# Patient Record
Sex: MALE | Race: WHITE | NOT HISPANIC OR LATINO | Employment: UNEMPLOYED | URBAN - NONMETROPOLITAN AREA
[De-identification: names, ages, dates, MRNs, and addresses within clinical notes are randomized per-mention and may not be internally consistent; named-entity substitution may affect disease eponyms.]

---

## 2022-02-11 ENCOUNTER — HOSPITAL ENCOUNTER (EMERGENCY)
Facility: HOSPITAL | Age: 37
Discharge: HOME | End: 2022-02-11
Attending: NURSE PRACTITIONER | Admitting: NURSE PRACTITIONER

## 2022-02-11 ENCOUNTER — NURSE TRIAGE (OUTPATIENT)
Dept: FAMILY MEDICINE | Facility: OTHER | Age: 37
End: 2022-02-11

## 2022-02-11 ENCOUNTER — APPOINTMENT (OUTPATIENT)
Dept: CT IMAGING | Facility: HOSPITAL | Age: 37
End: 2022-02-11
Attending: NURSE PRACTITIONER

## 2022-02-11 VITALS
DIASTOLIC BLOOD PRESSURE: 79 MMHG | HEART RATE: 67 BPM | SYSTOLIC BLOOD PRESSURE: 132 MMHG | RESPIRATION RATE: 16 BRPM | OXYGEN SATURATION: 99 % | TEMPERATURE: 98.6 F

## 2022-02-11 DIAGNOSIS — H00.013: ICD-10-CM

## 2022-02-11 DIAGNOSIS — L72.0 EPIDERMAL CYST OF FACE: Primary | ICD-10-CM

## 2022-02-11 LAB
ANION GAP SERPL CALCULATED.3IONS-SCNC: 5 MMOL/L (ref 3–14)
BUN SERPL-MCNC: 7 MG/DL (ref 7–30)
CALCIUM SERPL-MCNC: 9.3 MG/DL (ref 8.5–10.1)
CHLORIDE BLD-SCNC: 106 MMOL/L (ref 94–109)
CO2 SERPL-SCNC: 29 MMOL/L (ref 20–32)
CREAT SERPL-MCNC: 0.99 MG/DL (ref 0.66–1.25)
GFR SERPL CREATININE-BSD FRML MDRD: >90 ML/MIN/1.73M2
GLUCOSE BLD-MCNC: 100 MG/DL (ref 70–99)
POTASSIUM BLD-SCNC: 3.7 MMOL/L (ref 3.4–5.3)
SODIUM SERPL-SCNC: 140 MMOL/L (ref 133–144)

## 2022-02-11 PROCEDURE — 250N000011 HC RX IP 250 OP 636: Performed by: NURSE PRACTITIONER

## 2022-02-11 PROCEDURE — G0463 HOSPITAL OUTPT CLINIC VISIT: HCPCS

## 2022-02-11 PROCEDURE — 99213 OFFICE O/P EST LOW 20 MIN: CPT | Performed by: NURSE PRACTITIONER

## 2022-02-11 PROCEDURE — 80048 BASIC METABOLIC PNL TOTAL CA: CPT | Performed by: NURSE PRACTITIONER

## 2022-02-11 PROCEDURE — 70487 CT MAXILLOFACIAL W/DYE: CPT

## 2022-02-11 PROCEDURE — 36415 COLL VENOUS BLD VENIPUNCTURE: CPT | Performed by: NURSE PRACTITIONER

## 2022-02-11 RX ORDER — IOPAMIDOL 755 MG/ML
100 INJECTION, SOLUTION INTRAVASCULAR ONCE
Status: COMPLETED | OUTPATIENT
Start: 2022-02-11 | End: 2022-02-11

## 2022-02-11 RX ORDER — ERYTHROMYCIN 5 MG/G
0.5 OINTMENT OPHTHALMIC 4 TIMES DAILY
Qty: 10 G | Refills: 0 | Status: SHIPPED | OUTPATIENT
Start: 2022-02-11 | End: 2022-02-16

## 2022-02-11 RX ADMIN — IOPAMIDOL 100 ML: 755 INJECTION, SOLUTION INTRAVENOUS at 18:13

## 2022-02-11 ASSESSMENT — ENCOUNTER SYMPTOMS
SHORTNESS OF BREATH: 0
FEVER: 0
EYE REDNESS: 0
EYE ITCHING: 0
NAUSEA: 0
PSYCHIATRIC NEGATIVE: 1
DIARRHEA: 0
VOMITING: 0
EYE DISCHARGE: 0
EYE PAIN: 0
CHILLS: 0
PHOTOPHOBIA: 0

## 2022-02-11 NOTE — TELEPHONE ENCOUNTER
"Pt calling and is worried about a cyst near right corner of eye.He has had this for about two years.He was out shoveling and when he came in the middle of cyst had cream drainage coming out of it. Blood, oil and cream per pt. His right side of cheek hurst like a needle poking and feels numb.Denies numbness anywhere else. He states the blood vessels in seem to be bulging more.Is a little more warmer to touch than other parts of face.Was red when he first came in from shoveling but now just some of it is. He is worried it is spreading in his face.Advised ED now and if needs immediate attention call 911. He verbalized understanding.    Caitlin Campbell, RN      Additional Information    Negative: [1] Widespread rash AND [2] bright red, sunburn-like AND [3] too weak to stand    Negative: Sounds like a life-threatening emergency to the triager    Negative: Painful lump or swelling at opening to anus (rectum)    Negative: Painful lump or swelling at opening to vagina (on labia)    Negative: Painful lump or swelling on scrotum    Negative: Impetigo suspected or diagnosed    Negative: Doesn't match the SYMPTOMS of a boil    Negative: MRSA, questions about (No boil or other skin lesion)    Negative: Widespread red rash    Negative: Black (necrotic) color or blisters develop in wound    Negative: Patient sounds very sick or weak to the triager    Answer Assessment - Initial Assessment Questions  1. APPEARANCE of BOIL: \"What does the boil look like?\"       Two grapes combined on right eye, outside corner-has white coming out of it today out of the center of it, it was leaking and blood,oil combined, super red when he came back from shoveling,warm to touch, veins are showing more than normal  2. LOCATION: \"Where is the boil located?\"         3. NUMBER: \"How many boils are there?\"       One big one see above  4. SIZE: \"How big is the boil?\" (e.g., inches, cm; compare to size of a coin or other object)        5. ONSET: \"When did the " "boil start?\"      2 years ago  6. PAIN: \"Is there any pain?\" If so, ask: \"How bad is the pain?\"   (Scale 1-10; or mild, moderate, severe)      Whole face on right side hurts,numb on cheek bone feels like eye is hard to shut but feels normal.pricking feeling on cheeck  7. FEVER: \"Do you have a fever?\" If so, ask: \"What is it, how was it measured, and when did it start?\"      no8. SOURCE: \"Have you been around anyone with boils or other Staph infections?\" \"Have you ever had boils before?\"      no  9. OTHER SYMPTOMS: \"Do you have any other symptoms?\" (e.g., shaking chills, weakness, rash elsewhere on body)      no  10. PREGNANCY: \"Is there any chance you are pregnant?\" \"When was your last menstrual period?\"    Protocols used: BOIL (SKIN ABSCESS)-NAYA-AH      "

## 2022-02-11 NOTE — ED PROVIDER NOTES
History     Chief Complaint   Patient presents with     Cyst     HPI  Sarath Herrera is a 36 year old male who presents to urgent care today with complaints of a cyst to right side of face under right eye.  Onset 2 years ago but has been gradually increasing in size.  Today patient accidentally hit area with a shovel which caused pain and some creamy brown drainage.  Current pain 4/10, has not taken anything for discomfort.  Denies any eye pain, photophobia or vision changes.  Wears eyeglasses, has not followed up for an eye exam in 2+ years.  Denies any recent illness.  Denies any fever, chills, nausea, vomiting, diarrhea, shortness of breath or chest pain.  No other concerns.    Allergies:  No Known Allergies    Problem List:    There are no problems to display for this patient.       Past Medical History:    No past medical history on file.    Past Surgical History:    No past surgical history on file.    Family History:    No family history on file.    Social History:  Marital Status:  Single [1]  Social History     Tobacco Use     Smoking status: Not on file     Smokeless tobacco: Not on file   Substance Use Topics     Alcohol use: Not on file     Drug use: Not on file        Medications:    erythromycin (ROMYCIN) 5 MG/GM ophthalmic ointment      Review of Systems   Constitutional: Negative for chills and fever.   Eyes: Negative for photophobia, pain, discharge, redness, itching and visual disturbance.   Respiratory: Negative for shortness of breath.    Cardiovascular: Negative for chest pain.   Gastrointestinal: Negative for diarrhea, nausea and vomiting.   Musculoskeletal: Negative for gait problem.   Skin:        Cyst under right eye   Psychiatric/Behavioral: Negative.      Physical Exam   BP: 132/79  Pulse: 67  Temp: 98.6  F (37  C)  Resp: 16  SpO2: 99 %    Physical Exam  Vitals and nursing note reviewed.   Constitutional:       General: He is not in acute distress.     Appearance: He is not ill-appearing or  toxic-appearing.   HENT:      Right Ear: Tympanic membrane, ear canal and external ear normal.      Left Ear: Tympanic membrane, ear canal and external ear normal.      Nose: Nose normal.      Mouth/Throat:      Mouth: Mucous membranes are moist.      Pharynx: Oropharynx is clear. No oropharyngeal exudate or posterior oropharyngeal erythema.   Eyes:      General:         Right eye: Hordeolum present. No foreign body or discharge.      Extraocular Movements: Extraocular movements intact.      Conjunctiva/sclera: Conjunctivae normal.      Right eye: Right conjunctiva is not injected. No chemosis, exudate or hemorrhage.     Pupils: Pupils are equal, round, and reactive to light.   Cardiovascular:      Rate and Rhythm: Normal rate and regular rhythm.      Pulses: Normal pulses.      Heart sounds: Normal heart sounds.   Pulmonary:      Effort: Pulmonary effort is normal.      Breath sounds: Normal breath sounds.   Skin:     Comments: Cyst right side of face, under right eye.   Neurological:      Mental Status: He is alert.   Psychiatric:         Mood and Affect: Mood normal.           ED Course     Results for orders placed or performed during the hospital encounter of 02/11/22 (from the past 24 hour(s))   Basic metabolic panel   Result Value Ref Range    Sodium 140 133 - 144 mmol/L    Potassium 3.7 3.4 - 5.3 mmol/L    Chloride 106 94 - 109 mmol/L    Carbon Dioxide (CO2) 29 20 - 32 mmol/L    Anion Gap 5 3 - 14 mmol/L    Urea Nitrogen 7 7 - 30 mg/dL    Creatinine 0.99 0.66 - 1.25 mg/dL    Calcium 9.3 8.5 - 10.1 mg/dL    Glucose 100 (H) 70 - 99 mg/dL    GFR Estimate >90 >60 mL/min/1.73m2   CT Facial Bones with Contrast    Narrative    CT FACIAL BONES WITH CONTRAST    HISTORY: Right facial cyst,    TECHNIQUE: Contiguous axial images through the facial bones were  performed with contrast.      COMPARISON: None    FINDINGS:  Along the superficial aspect of the distal right  zygoma/lateral orbital wall, there is a soft tissue  density 2.8 x 2.1  cm mass. No underlying bony scalloping or invasion is identified. No  intraorbital component is seen.    No acute facial bone fracture or dislocation is identified.  No  orbital fracture is identified.  The globes are intact.  There is no  evidence of intraorbital hematoma or stranding.    The temporomandibular joints are intact. The visualized paranasal  sinuses and mastoid air cells are clear.      A right maxillary impacted molar with cyst formation is seen. Multiple  dental caries are noted.      Impression    IMPRESSION:    2.8 cm superficial soft tissue nodule of the right face. No underlying  bony invasion or reactive change. Differential considerations include  an epidermal inclusion cyst. Correlate with physical exam.    Cystic change associated with an unerupted right maxillary molar.  Multiple dental caries. Recommend dental follow-up.    TERRI NIEVES MD         SYSTEM ID:  [id number]       Medications   iopamidol (ISOVUE-370) solution 100 mL (100 mLs Intravenous Given 2/11/22 1813)   sodium chloride (PF) 0.9% PF flush 60 mL (50 mLs Intravenous Given 2/11/22 1813)       Assessments & Plan (with Medical Decision Making)     I have reviewed the nursing notes.    I have reviewed the findings, diagnosis, plan and need for follow up with the patient.  (L72.0) Epidermal cyst of face  (primary encounter diagnosis)  Plan: Otolaryngology Referral  (H00.013) Hordeolum externum of right eye  Plan:   Patient ambulatory with a nontoxic appearance.  Cyst to right side of face and hordeolum present to right upper eyelid.  Patient denies any eye pain, photophobia or visual disturbances.  Denies any fever, chills, nausea, vomiting, diarrhea, shortness of breath or chest pain.  CT facial bones with contrast completed and impression shows 2.8 cm superficial soft tissue nodule of the right face.  No underlying bony invasion or reactive change.  ENT referral placed for further evaluation.  Patient to  return to urgent care/ED with any worsening of cyst or signs of infection.  Warm compresses to hordeolum of right upper eyelid. Erythromycin eye ointment ordered.  Patient to return to urgent care-ED as needed.  Patient in agreement with treatment plan.    Discharge Medication List as of 2/11/2022  7:07 PM      START taking these medications    Details   erythromycin (ROMYCIN) 5 MG/GM ophthalmic ointment Place 0.5 inches into the right eye 4 times daily for 5 daysDisp-10 g, [id number]           Final diagnoses:   Epidermal cyst of face   Hordeolum externum of right eye     2/11/2022   [facility]     Kezia Valle, NP  02/11/22 1944

## 2022-02-11 NOTE — ED TRIAGE NOTES
"Pt presents with c/o a cyst under his right eye. Reports he has had it for two years. States he was shoveling when it started hurting then some drainage came out. Drainage was a \"cream brownish color\" then turned into a bloody substance. Pt [facility] into a free [facility] [place] [facility] months ago and was suppose to go back to schedule surgery.   "

## 2022-02-12 NOTE — DISCHARGE INSTRUCTIONS
Follow up with ENT for further evaluation of cyst.    Warm compresses to right eye followed by erythromycin eye ointment.  Clean washcloth for each use.      Return to urgent care-ED with any worsening in condition or additional concerns.          What to expect when you have contrast    During your exam, we will inject  contrast  into your vein or artery. (Contrast is a clear liquid with iodine in it. It shows up on X-rays.)    You may feel warm or hot. You may have a metal taste in your mouth and a slight upset stomach. You may also feel pressure near the kidneys and bladder. These effects will last about 1 to 3 minutes.    Please tell us if you have:   Sneezing    Itching   Hives    Swelling in the face   A hoarse voice   Breathing problems   Other new symptoms    Serious problems are rare.  They may include:   Irregular heartbeat    Seizures   Kidney failure             Tissue damage   Shock     Death    If you have any problems during the exam, we  will treat them right away.    When you get home    Call your hospital if you have any new symptoms in the next 2 days, like hives or swelling. (Phone numbers are at the bottom of this page.) Or call your family doctor.     If you have wheezing or trouble breathing, call 911.    Self-care  -Drink at least 4 extra glasses of water today.   This reduces the stress on your kidneys.  -Keep taking your regular medicines.    The contrast will pass out of your body in your  Urine(pee). This will happen in the next 24 hours. You  will not feel this. Your urine will not  change color.    If you have kidney problems or take metformin    Drink 4 to 8 large glasses of water for the next  2 days, if you are not on a fluid restriction.    ?If you take metformin (Glucophage or Glucovance) for diabetes, keep taking it.      ?Your kidney function tests are abnormal.  If you take Metformin, do not take it for 48 hours. Please go to your clinic for a blood test within 3 days after your  exam before the restarting this medicine.     (Note to provider:please give patient prescription for lab tests.)    ?Special instructions: -    I have read and understand the above information.    Patient Sign Here:______________________________________Date:________Time:______    Staff Sign Here:________________________________________Date:_______Time:______      Radiology Departments:     ?[facility]: [phone] ?[place]: [phone]     ?[place]: [phone] ?[facility]:[phone]      ?[place]: [phone]  ?[place]: [phone]  ?[place]:[phone]    ?[facility]:[phone]  ?[facility]:[phone]

## 2022-02-16 NOTE — PROGRESS NOTES
Otolaryngology Consultation    Patient: Sarath Herrera  : 1985    Patient presents with:  Consult: Epidermal Cyst of Face; Referred by Kezia Valle NP      HPI:  Sarath Herrera is a 36 year old male seen today for a referral from Kezia Valle NP in urgent care regarding a right facial cyst.  He presented to urgent care on  for evaluation of a right facial cyst.     This has been present over 2 years and has increased in size.   He initially thought this was a pimple but it has been slowly growing over the past 2 years.    Prior to his urgent care visit he had been out shovelling and noted the area enlarged, drained and has been more painful since exposure to cold.  Recent malodorous discharge.  Pain 9-10/10   Difficult to wear glasses due to the mass    He has been developing upper lid styes bilaterally x 2 weeks    He denies change in vision or any prior surgery to the area but does notice some is obstructed right peripheral vision due to the size of the mass    He denies any prior surgery or manipulation of the cyst    No prior history of trauma to area    Photos reviewed in epic there is a large right lateral infraorbital cyst.  This appears to be an epidermal inclusion cyst.  The upper lid was erythematous and edematous on 211.    CT facial bones was ordered in urgent care showing a zygomatical orbital soft tissue mass measuring 2.8 x 2.1 cm.  No bone invasion or erosion no intraorbital extension.  No fractures    Multiple dental caries are noted and a right impacted molar with cystic change    CT was reviewed the cyst is located at the medial end of the zygoma the zygoma is otherwise normal in appearance        No current outpatient medications on file.       Allergies: Patient has no known allergies.     No past medical history on file.    No past surgical history on file.    ENT family history reviewed    Social History     Tobacco Use     Smoking status: Never Smoker     Smokeless tobacco:  "Never Used   Substance Use Topics     Alcohol use: None     Drug use: Yes     Types: Marijuana     Comment: occassional       Review of Systems  ROS: 10 point ROS neg other than the symptoms noted above in the HPI and headaches    Physical Exam  /68 (BP Location: Right arm, Patient Position: Sitting, Cuff Size: Adult Large)   Pulse 93   Temp 98.5  F (36.9  C) (Tympanic)   Ht 1.854 m (6' 1\")   Wt 78.5 kg (173 lb)   SpO2 98%   BMI 22.82 kg/m    General - The patient is well nourished and well developed, and appears to have good nutritional status.  Alert and oriented to person and place, answers questions and cooperates with examination appropriately.   Head and Face - Normocephalic and atraumatic, with no gross asymmetry noted.  The facial nerve is intact, with strong symmetric movements.  Grade 1 out of 6 bilaterally.  Voice and Breathing - The patient was breathing comfortably without the use of accessory muscles. There was no wheezing, stridor, or stertor.  The patients voice was clear and strong, and had appropriate pitch and quality.  No landon peripheral digital clubbing or cyanosis   Eyes -large right infraorbital cyst.  Clinically this is consistent with a sebaceous cyst.  Photos in epic.  This measures 3 x 2.5 cm.  No drainage, mildly tender, no ulceration.    Bilateral upper lid erythematous nodules with central papule clinically consistent with hordeolum  Extraocular movements intact, and the pupils were reactive to light.  Sclera were not icteric or injected, conjunctiva were pink and moist.  Mouth - Examination of the oral cavity showed pink, healthy oral mucosa. No lesions or ulcerations noted.  The tongue was mobile and midline, and the dentition were in scattered condition with several missing teeth  Throat - The walls of the oropharynx were smooth, pink, moist, symmetric, and had no lesions or ulcerations.  The tonsillar pillars and soft palate were symmetric.  The uvula was midline on " elevation.     Nose - Nasal mucosa is pink and moist with no abnormal mucus.      Procedure:     PREOPERATIVE DIAGNOSES:   1.  Right infraorbital epidermal inclusion cyst cyst    POSTOPERATIVE DIAGNOSES:   1.  Same    PROCEDURE PERFORMED:   1.  Excision a complex right infraorbital inclusion cyst cyst incision length 2.8 cm     I discussed the risks and complications of excision right infraorbital cyst, including local anesthesia, bleeding, infection, injury to the eye,  facial nerve, scar formation, hypertrophic healing or keloid, numbness to area, recurrence of lesion, possible need for further surgery. All questions were answered and he agreed to proceed.    After informed consent was discussed and a time- out taken, I proceeded to cleanse the skin around the lesion with alcohol.  I then demarcated an incision parallel to relaxed skin tension lines overlying the cyst at the right infraorbital rim.  The upper lid was taped to protect the cornea.  The area was prepped and draped in the normal sterile fashion.  A timeout was taken.  I then infiltrated the skin with 1% lidocaine with 1:200,000 epinephrine.  I  used a 15-blade to incise the skin.    Bradley retractors were placed.  I used blunt and sharp dissection until I could identify the pseudocapsule of the sebaceous cyst.  The cyst was partially ruptured and a portion of the cystic material was removed with suction during dissection.  I was able to completely separate the entire capsule from the underlying subcutaneous tissue and orbicularis oculi.  The cyst was completely removed.  Due to the size and the location of the epidermal cyst this was submitted in formalin to pathology.  The wound was irrigated and gently suctioned.  Hemostasis was achieved with scant use of electrocautery and silver nitrate cautery and is adequate.    The total length of the incision was  2.8 cm.    There is a significant amount of redundant skin.  I trimmed the redundant lower lid  skin with tenotomy scissors for tension free closure.   I then proceeded to close the incision by using simple interrupted buried knot sutures, using 5-0 Vicryl.   The skin edges were then reapproximated using 5-0 nylon, simple interrupted sutures.  Antibiotic ointment was then applied and the wound was dressed.  The patient tolerated the procedure without any difficulty.        Impression and Plan- Sarath Herrera is a 36 year old male with:      ICD-10-CM    1. Hordeolum externum, bilateral upper lids  H00.019 Adult Eye Referral   2. Epidermal cyst of lower eyelid  L72.0 oxyCODONE (ROXICODONE) 5 MG tablet     cephALEXin (KEFLEX) 500 MG capsule     Surgical Pathology Exam     Excision of Eyelid Lesion   3. Dental caries  K02.9        Verbal and written wound care instructions were provided    Follow-up with DDS  Follow-up optometry    1 week follow-up       Leave the dressing in place for 3 days  Once the dressing comes off, begin wound cares  Take the antibiotic as prescribed  Take the pain medication if needed  Follow up with ENT in 1 week          Estelita Wilson D.O.  Otolaryngology/Head and Neck Surgery  Allergy

## 2022-02-17 ENCOUNTER — OFFICE VISIT (OUTPATIENT)
Dept: OTOLARYNGOLOGY | Facility: OTHER | Age: 37
End: 2022-02-17
Attending: NURSE PRACTITIONER

## 2022-02-17 VITALS
BODY MASS INDEX: 22.93 KG/M2 | OXYGEN SATURATION: 98 % | HEIGHT: 73 IN | HEART RATE: 93 BPM | SYSTOLIC BLOOD PRESSURE: 110 MMHG | DIASTOLIC BLOOD PRESSURE: 68 MMHG | WEIGHT: 173 LBS | TEMPERATURE: 98.5 F

## 2022-02-17 DIAGNOSIS — K02.9 DENTAL CARIES: ICD-10-CM

## 2022-02-17 DIAGNOSIS — L72.0 EPIDERMAL CYST OF FACE: ICD-10-CM

## 2022-02-17 DIAGNOSIS — H00.019 HORDEOLUM EXTERNUM, UNSPECIFIED LATERALITY: Primary | ICD-10-CM

## 2022-02-17 PROCEDURE — 88304 TISSUE EXAM BY PATHOLOGIST: CPT | Mod: TC | Performed by: OTOLARYNGOLOGY

## 2022-02-17 PROCEDURE — 11443 EXC FACE-MM B9+MARG 2.1-3 CM: CPT | Performed by: OTOLARYNGOLOGY

## 2022-02-17 PROCEDURE — 88304 TISSUE EXAM BY PATHOLOGIST: CPT | Mod: 26 | Performed by: PATHOLOGY

## 2022-02-17 PROCEDURE — 99204 OFFICE O/P NEW MOD 45 MIN: CPT | Mod: 25 | Performed by: OTOLARYNGOLOGY

## 2022-02-17 RX ORDER — OXYCODONE HYDROCHLORIDE 5 MG/1
5 TABLET ORAL EVERY 6 HOURS PRN
Qty: 5 TABLET | Refills: 0 | Status: SHIPPED | OUTPATIENT
Start: 2022-02-17 | End: 2022-02-21

## 2022-02-17 RX ORDER — CEPHALEXIN 500 MG/1
500 CAPSULE ORAL 3 TIMES DAILY
Qty: 21 CAPSULE | Refills: 0 | Status: SHIPPED | OUTPATIENT
Start: 2022-02-17 | End: 2022-02-21

## 2022-02-17 NOTE — LETTER
2022         RE: Sarath Herrera  [address]  [facility] [address]        Dear Colleague,    Thank you for referring your patient, Sarath Herrera, to the [facility]. Please see a copy of my visit note below.    Otolaryngology Consultation    Patient: Sarath Herrera  : 1985    Patient presents with:  Consult: Epidermal Cyst of Face; Referred by Kezia Valle NP      HPI:  Sarath Herrera is a 36 year old male seen today for a referral from Kezia Valle NP in urgent care regarding a right facial cyst.  He presented to urgent care on  for evaluation of a right facial cyst.     This has been present over 2 years and has increased in size.   He initially thought this was a pimple but it has been slowly growing over the past 2 years.    Prior to his urgent care visit he had been out shovelling and noted the area enlarged, drained and has been more painful since exposure to cold.  Recent malodorous discharge.  Pain 9-10/10   Difficult to wear glasses due to the mass    He has been developing upper lid styes bilaterally x 2 weeks    He denies change in vision or any prior surgery to the area but does notice some is obstructed right peripheral vision due to the size of the mass    He denies any prior surgery or manipulation of the cyst    No prior history of trauma to area    Photos reviewed in epic there is a large right lateral infraorbital cyst.  This appears to be an epidermal inclusion cyst.  The upper lid was erythematous and edematous on 211.    CT facial bones was ordered in urgent care showing a zygomatical orbital soft tissue mass measuring 2.8 x 2.1 cm.  No bone invasion or erosion no intraorbital extension.  No fractures    Multiple dental caries are noted and a right impacted molar with cystic change    CT was reviewed the cyst is located at the medial end of the zygoma the zygoma is otherwise normal in appearance        No current outpatient medications on file.  "      Allergies: Patient has no known allergies.     No past medical history on file.    No past surgical history on file.    ENT family history reviewed    Social History     Tobacco Use     Smoking status: Never Smoker     Smokeless tobacco: Never Used   Substance Use Topics     Alcohol use: None     Drug use: Yes     Types: Marijuana     Comment: occassional       Review of Systems  ROS: 10 point ROS neg other than the symptoms noted above in the HPI and headaches    Physical Exam  /68 (BP Location: Right arm, Patient Position: Sitting, Cuff Size: Adult Large)   Pulse 93   Temp 98.5  F (36.9  C) (Tympanic)   Ht 1.854 m (6' 1\")   Wt 78.5 kg (173 lb)   SpO2 98%   BMI 22.82 kg/m    General - The patient is well nourished and well developed, and appears to have good nutritional status.  Alert and oriented to person and place, answers questions and cooperates with examination appropriately.   Head and Face - Normocephalic and atraumatic, with no gross asymmetry noted.  The facial nerve is intact, with strong symmetric movements.  Grade 1 out of 6 bilaterally.  Voice and Breathing - The patient was breathing comfortably without the use of accessory muscles. There was no wheezing, stridor, or stertor.  The patients voice was clear and strong, and had appropriate pitch and quality.  No landon peripheral digital clubbing or cyanosis   Eyes -large right infraorbital cyst.  Clinically this is consistent with a sebaceous cyst.  Photos in epic.  This measures 3 x 2.5 cm.  No drainage, mildly tender, no ulceration.    Bilateral upper lid erythematous nodules with central papule clinically consistent with hordeolum  Extraocular movements intact, and the pupils were reactive to light.  Sclera were not icteric or injected, conjunctiva were pink and moist.  Mouth - Examination of the oral cavity showed pink, healthy oral mucosa. No lesions or ulcerations noted.  The tongue was mobile and midline, and the dentition were " in scattered condition with several missing teeth  Throat - The walls of the oropharynx were smooth, pink, moist, symmetric, and had no lesions or ulcerations.  The tonsillar pillars and soft palate were symmetric.  The uvula was midline on elevation.     Nose - Nasal mucosa is pink and moist with no abnormal mucus.      Procedure:     PREOPERATIVE DIAGNOSES:   1.  Right infraorbital epidermal inclusion cyst cyst    POSTOPERATIVE DIAGNOSES:   1.  Same    PROCEDURE PERFORMED:   1.  Excision a complex right infraorbital inclusion cyst cyst incision length 2.8 cm     I discussed the risks and complications of excision right infraorbital cyst, including local anesthesia, bleeding, infection, injury to the eye,  facial nerve, scar formation, hypertrophic healing or keloid, numbness to area, recurrence of lesion, possible need for further surgery. All questions were answered and he agreed to proceed.    After informed consent was discussed and a time- out taken, I proceeded to cleanse the skin around the lesion with alcohol.  I then demarcated an incision parallel to relaxed skin tension lines overlying the cyst at the right infraorbital rim.  The upper lid was taped to protect the cornea.  The area was prepped and draped in the normal sterile fashion.  A timeout was taken.  I then infiltrated the skin with 1% lidocaine with 1:200,000 epinephrine.  I  used a 15-blade to incise the skin.    Bradley retractors were placed.  I used blunt and sharp dissection until I could identify the pseudocapsule of the sebaceous cyst.  The cyst was partially ruptured and a portion of the cystic material was removed with suction during dissection.  I was able to completely separate the entire capsule from the underlying subcutaneous tissue and orbicularis oculi.  The cyst was completely removed.  Due to the size and the location of the epidermal cyst this was submitted in formalin to pathology.  The wound was irrigated and gently  suctioned.  Hemostasis was achieved with scant use of electrocautery and silver nitrate cautery and is adequate.    The total length of the incision was  2.8 cm.    There is a significant amount of redundant skin.  I trimmed the redundant lower lid skin with tenotomy scissors for tension free closure.   I then proceeded to close the incision by using simple interrupted buried knot sutures, using 5-0 Vicryl.   The skin edges were then reapproximated using 5-0 nylon, simple interrupted sutures.  Antibiotic ointment was then applied and the wound was dressed.  The patient tolerated the procedure without any difficulty.        Impression and Plan- Sarath Herrera is a 36 year old male with:      ICD-10-CM    1. Hordeolum externum, bilateral upper lids  H00.019 Adult Eye Referral   2. Epidermal cyst of lower eyelid  L72.0 oxyCODONE (ROXICODONE) 5 MG tablet     cephALEXin (KEFLEX) 500 MG capsule     Surgical Pathology Exam     Excision of Eyelid Lesion   3. Dental caries  K02.9        Verbal and written wound care instructions were provided    Follow-up with DDS  Follow-up optometry    1 week follow-up       Leave the dressing in place for 3 days  Once the dressing comes off, begin wound cares  Take the antibiotic as prescribed  Take the pain medication if needed  Follow up with ENT in 1 week          SHALONDA Pinto.O.  Otolaryngology/Head and Neck Surgery  Allergy                    Again, thank you for allowing me to participate in the care of your patient.        Sincerely,        Estelita Wilson MD

## 2022-02-17 NOTE — NURSING NOTE
"Chief Complaint   Patient presents with     Consult     Epidermal Cyst of Face; Referred by Kezia Valle NP       Initial /68 (BP Location: Right arm, Patient Position: Sitting, Cuff Size: Adult Large)   Pulse 93   Temp 98.5  F (36.9  C) (Tympanic)   Ht 1.854 m (6' 1\")   Wt 78.5 kg (173 lb)   SpO2 98%   BMI 22.82 kg/m   Estimated body mass index is 22.82 kg/m  as calculated from the following:    Height as of this encounter: 1.854 m (6' 1\").    Weight as of this encounter: 78.5 kg (173 lb).  Medication Reconciliation: complete  Lilliana Conley LPN    "

## 2022-02-17 NOTE — PATIENT INSTRUCTIONS
Thank you for allowing Dr. Wilson and our ENT team to participate in your care.  If your medications are too expensive, please give the nurse a call.  We can possibly change this medication.  If you have a scheduling or an appointment question please contact our Health Unit Coordinator at their direct line [phone].   ALL nursing questions or concerns can be directed to your ENT nurse at: [phone] - [place]    Leave the dressing in place for 3 days  Once the dressing comes off, begin wound cares  Take the antibiotic as prescribed  Take the pain medication if needed  Follow up with ENT in 1 week      POST PROCEDURE INSTRUCTIONS      Remove your dressing in 24 hours (If you have one)    Wash incision with Gentle Cleanser Twice Daily (Cetaphil, Baby Shampoo, etc)    Apply Bacitracin Ointment Three Times Daily; After 1 week, use Aquaphor Ointment     Cover with a clean dressing if in a dirty coy environment or when wet/soiled    Keep incision clean and dry   Do NOT soak in water such as a tub bath or swimming   Do NOT put make-up, powders, hairspray, lotions, etc on the incision       You can apply ice to the surgical area to help reduce swelling. (no longer than 20 minutes at a time)      You can use acetaminophen(Tylenol) or the prescription you received for pain.       If you have any bleeding, cover the wound with clean gauze and hold pressure for 10 Minutes. If the bleeding does not stop or is heavy and profuse, call the clinic or go to the Urgent Care/Emergency Department.    SIGNS OF INFECTION ARE:    Redness, swelling, red streaks, pus, drainage, warmth, fever, increased pain, foul smell.     Contact your primary health care provider if you notice any of the warning signs.     FOLLOW - UP    Follow-up in clinic for a nurse only visit in 7 days for suture removal.     Pathology results will be called to you when they are back. Usually 7-10 days.      6 WEEKS POST PROCEDURE      Apply ANY type of  lotion to the suture site(Example - Vaseline Intensive Care or Vitamin E)    Massage the surgical area 1-2 times daily in a circular motion for 5 minutes, for a period of 2 months. This will help the scar heal better.

## 2022-02-21 ENCOUNTER — HOSPITAL ENCOUNTER (EMERGENCY)
Facility: HOSPITAL | Age: 37
Discharge: LEFT BEFORE DISCHARGE | End: 2022-02-21
Admitting: STUDENT IN AN ORGANIZED HEALTH CARE EDUCATION/TRAINING PROGRAM

## 2022-02-21 ENCOUNTER — OFFICE VISIT (OUTPATIENT)
Dept: OTOLARYNGOLOGY | Facility: OTHER | Age: 37
End: 2022-02-21
Attending: OTOLARYNGOLOGY

## 2022-02-21 VITALS
HEART RATE: 74 BPM | BODY MASS INDEX: 22.93 KG/M2 | WEIGHT: 173 LBS | DIASTOLIC BLOOD PRESSURE: 64 MMHG | TEMPERATURE: 97.2 F | HEIGHT: 73 IN | OXYGEN SATURATION: 99 % | SYSTOLIC BLOOD PRESSURE: 110 MMHG

## 2022-02-21 DIAGNOSIS — L76.33 POSTOPERATIVE SEROMA OF SKIN AFTER DERMATOLOGIC PROCEDURE: Primary | ICD-10-CM

## 2022-02-21 PROCEDURE — 999N000104 HC STATISTIC NO CHARGE

## 2022-02-21 PROCEDURE — 10140 I&D HMTMA SEROMA/FLUID COLLJ: CPT | Performed by: OTOLARYNGOLOGY

## 2022-02-21 RX ORDER — OXYCODONE HYDROCHLORIDE 5 MG/1
5 TABLET ORAL EVERY 6 HOURS PRN
Qty: 3 TABLET | Refills: 0 | Status: SHIPPED | OUTPATIENT
Start: 2022-02-21 | End: 2022-03-02

## 2022-02-21 RX ORDER — CEPHALEXIN 500 MG/1
500 CAPSULE ORAL 3 TIMES DAILY
Qty: 21 CAPSULE | Refills: 0 | Status: SHIPPED | OUTPATIENT
Start: 2022-02-21 | End: 2022-03-02

## 2022-02-21 ASSESSMENT — PAIN SCALES - GENERAL: PAINLEVEL: WORST PAIN (10)

## 2022-02-21 NOTE — PROGRESS NOTES
"Otolaryngology Progress Note          Sarath Herrera is a 36 year old male  5 days status post excision of a large right infraorbital epidermal inclusion cyst presents for concerns with his wound.  He has noted bleeding and swelling.    no change in vision or orbitalgia     He was placed on Keflex postoperatively and given oxycodone for pain not controlled with Tylenol and ibuprofen    Although symptoms appear to be a simple epidermal cyst due to the size and location I did send this to pathology and the final is pending    Afebrile        Physical Exam  /64 (BP Location: Left arm, Cuff Size: Adult Regular)   Pulse 74   Temp 97.2  F (36.2  C) (Tympanic)   Ht 1.854 m (6' 1\")   Wt 78.5 kg (173 lb)   SpO2 99%   BMI 22.82 kg/m      General - The patient is well nourished and well developed, and appears to have good nutritional status.  Alert and oriented to person and place, interactive.  Head and Face - Normocephalic and atraumatic, with no gross asymmetry noted of the contour of the facial features.  The facial nerve is intact, with strong symmetric movements.    Eyes - Extraocular movements intact.   He has diffuse right infraorbital ecchymosis and mild surrounding lid edema with slight fluctuance, no purulence  Right upper lid mild echymosis  steris removed    Office Procedure:   Right infraorbital wound exploration  I discussed the risks and complications including local anesthesia, bleeding, infection, injury to the facial nerve, scar formation, hypertrophic healing or keloid, numbness to area, recurrence of lesion, seroma or hematoma, possible need for further surgery. All questions were answered.  written consent was obtained.    A time- out taken, I proceeded to cleanse the skin around the lesion with alcohol.  I then demarcated the lesion parallel to relaxed skin tension lines in a natural crease.  The area was prepped and draped in the normal sterile fashion.  I then infiltrated the skin with 1% " lidocaine with 1:200,000 epinephrine.  I removed the nylon sutures and irrigated a small amount of clotted material from the subcutaneous tissue.  The orbicularis appears healthy.  Slight oozing at the subcutaneous skin edges which was controlled with cautery.  No arterial bleeding.       The wound was copiously irrigated hemostasis is adequate.  No purulence.     I then reapproximated the subcutaneous tissue with 5-0 Vicryl in interrupted fashion x2.  The skin was closed with 5-0 nylon.  Incision length is 2.8 cm.  The wound was cleansed and a gentle pressure dressing with steris applied    His eye was protected throughout the procedure          Impression/Plan  Sarath Herrera is a 36 year old male    ICD-10-CM    1. Postoperative seroma of skin after skin procedure  L76.33          Start cold compress to right lower lid  Lubricating eye drops  Wound care instructions provided    Tylenol alternated with ibuprofen for pain.   I gave him #3 oxycodone  Extend cephalexin for 2 weeks with a probiotic        Follow-up in 1 week    Expect bruising and swelling for 2-3 weeks    May need scar revision in future due to severity of excess skin after cyst removal on 2/17        Estelita Wilson D.O.  Otolaryngology/Head and Neck Surgery  Allergy

## 2022-02-21 NOTE — PATIENT INSTRUCTIONS
Thank you for allowing Dr. Wilson and our ENT team to participate in your care.  If your medications are too expensive, please give the nurse a call.  We can possibly change this medication.  If you have a scheduling or an appointment question please contact our Health Unit Coordinator at their direct line [phone].   ALL nursing questions or concerns can be directed to your ENT nurse at: [phone] - [place]      POST PROCEDURE INSTRUCTIONS      Remove your dressing in 24 hours (If you have one)    Wash incision with Gentle Cleanser Twice Daily (Cetaphil, Baby Shampoo, etc)    Apply Bacitracin Ointment Three Times Daily; After 1 week, use Aquaphor Ointment     Cover with a clean dressing if in a dirty coy environment or when wet/soiled    Keep incision clean and dry   Do NOT soak in water such as a tub bath or swimming   Do NOT put make-up, powders, hairspray, lotions, etc on the incision       You can apply ice to the surgical area to help reduce swelling. (no longer than 20 minutes at a time)      You can use acetaminophen(Tylenol) or the prescription you received for pain.       If you have any bleeding, cover the wound with clean gauze and hold pressure for 10 Minutes. If the bleeding does not stop or is heavy and profuse, call the clinic or go to the Urgent Care/Emergency Department.    SIGNS OF INFECTION ARE:    Redness, swelling, red streaks, pus, drainage, warmth, fever, increased pain, foul smell.     Contact your primary health care provider if you notice any of the warning signs.     FOLLOW - UP    Follow-up in clinic for a nurse only visit in 7 days for suture removal.     Pathology results will be called to you when they are back. Usually 7-10 days.      6 WEEKS POST PROCEDURE      Apply ANY type of lotion to the suture site(Example - Vaseline Intensive Care or Vitamin E)    Massage the surgical area 1-2 times daily in a circular motion for 5 minutes, for a period of 2 months. This will help  the scar heal better.

## 2022-02-21 NOTE — NURSING NOTE
"Chief Complaint   Patient presents with     Follow Up     Epidermal Cyst of Lower Eyelid       Initial /64 (BP Location: Left arm, Cuff Size: Adult Regular)   Pulse 74   Temp 97.2  F (36.2  C) (Tympanic)   Ht 1.854 m (6' 1\")   Wt 78.5 kg (173 lb)   SpO2 99%   BMI 22.82 kg/m   Estimated body mass index is 22.82 kg/m  as calculated from the following:    Height as of this encounter: 1.854 m (6' 1\").    Weight as of this encounter: 78.5 kg (173 lb).  Medication Reconciliation: complete  Jing Barreto LPN    "

## 2022-02-22 ENCOUNTER — TELEPHONE (OUTPATIENT)
Dept: OTOLARYNGOLOGY | Facility: OTHER | Age: 37
End: 2022-02-22

## 2022-02-22 LAB
PATH REPORT.COMMENTS IMP SPEC: NORMAL
PATH REPORT.COMMENTS IMP SPEC: NORMAL
PATH REPORT.FINAL DX SPEC: NORMAL
PATH REPORT.GROSS SPEC: NORMAL
PATH REPORT.MICROSCOPIC SPEC OTHER STN: NORMAL
PATH REPORT.RELEVANT HX SPEC: NORMAL
PHOTO IMAGE: NORMAL

## 2022-02-22 NOTE — TELEPHONE ENCOUNTER
This patient had a recent re-excision of a facial lesion and is requesting more pain medication. He states that the OTC APAP/Ibuprofen is not working. Please Advise.     [facility]: [place]

## 2022-02-23 DIAGNOSIS — L76.33 POSTOPERATIVE SEROMA OF SKIN AFTER DERMATOLOGIC PROCEDURE: Primary | ICD-10-CM

## 2022-02-23 RX ORDER — OXYCODONE HYDROCHLORIDE 5 MG/1
5 TABLET ORAL EVERY 6 HOURS PRN
Qty: 5 TABLET | Refills: 0 | Status: SHIPPED | OUTPATIENT
Start: 2022-02-23 | End: 2022-03-02

## 2022-03-01 NOTE — PROGRESS NOTES
"Chief Complaint   Patient presents with     RECHECK     Follow up right infraorbital wound exploration in office 2/21/22.       Sarath Herrera is a 36 year old male  13 days status post excision of a large right infraorbital epidermal inclusion cyst presents for recheck. He did see Dr. Wilson on 2/21/22 for concerns of seroma. Wound was reexamined and re approximated and keflex extended for 2 weeks. He was recommended to return in 1 week for recheck and suture removal.   Today, Sarath has been doing well.   He reports no drainage since his last visit. He has noticed less swelling. Overall improved over the last few days.   He has no vision concerns or eye pain.   He was placed on Keflex postoperatively and given oxycodone for pain not controlled with Tylenol and ibuprofen  Although symptoms appear to be a simple epidermal cyst due to the size and location I did send this to pathology and the final benign, noted be a keratinous cyst, epidermal.        No past medical history on file.   No Known Allergies  Current Outpatient Medications   Medication     cephALEXin (KEFLEX) 500 MG capsule     oxyCODONE (ROXICODONE) 5 MG tablet     oxyCODONE (ROXICODONE) 5 MG tablet     No current facility-administered medications for this visit.     ROS- SEE HPI  /68 (Cuff Size: Adult Regular)   Pulse 80   Temp 98.1  F (36.7  C) (Tympanic)   Ht 1.854 m (6' 1\")   Wt 78.5 kg (173 lb)   SpO2 98%   BMI 22.82 kg/m      General - The patient is well nourished and well developed, and appears to have good nutritional status.  Alert and oriented to person and place, interactive.  Head and Face - Normocephalic and atraumatic, with no gross asymmetry noted of the contour of the facial features.  The facial nerve is intact, with strong symmetric movements.   ears- Clear EAC. TM intact without effusion or retraction.     Eyes - Extraocular movements intact.   He has diffuse right infraorbital ecchymosis and mild surrounding lid edema " with slight fluctuance, no purulence  Right upper lid mild echymosis  steris removed  The incision site appears with nylon sutures in place. Scant crusting. Mild edema. No erythema or warmth. Ecchymosis appears improved.   Upon suture removal, there is one small area of oozing, thick secretions approx 2 ml of drainage. Culture obtained. No further drainage noted.   Photos taken  Site was cleaned and dressing applied.       ASSESSMENT/ PLAN:        ICD-10-CM    1. Encounter for removal of sutures  Z48.02    2. Postoperative seroma of skin after dermatologic procedure  L76.33 Fungus Culture, non-blood     cephALEXin (KEFLEX) 500 MG capsule     Swab Aerobic Bacterial Culture Routine     CANCELED: Anaerobic and Aerobic Culture with Gram Stain ([facility])     CANCELED: Anaerobic and Aerobic Culture with Gram Stain ([facility])   3. Epidermal cyst of face  L72.0 Fungus Culture, non-blood     cephALEXin (KEFLEX) 500 MG capsule     Swab Aerobic Bacterial Culture Routine     CANCELED: Anaerobic and Aerobic Culture with Gram Stain ([facility])     CANCELED: Anaerobic and Aerobic Culture with Gram Stain ([facility])     Continue w/ wound cares.   Follow up in 1 week  Continue with Keflex (antibiotic) for 5 additional days.   If any concerns return sooner to ENT.       Pathology reviewed, Benign.           Geena Kline PA-C  ENT  [facility], [place]

## 2022-03-02 ENCOUNTER — OFFICE VISIT (OUTPATIENT)
Dept: OTOLARYNGOLOGY | Facility: OTHER | Age: 37
End: 2022-03-02
Attending: PHYSICIAN ASSISTANT

## 2022-03-02 VITALS
HEART RATE: 80 BPM | OXYGEN SATURATION: 98 % | BODY MASS INDEX: 22.93 KG/M2 | HEIGHT: 73 IN | WEIGHT: 173 LBS | TEMPERATURE: 98.1 F | DIASTOLIC BLOOD PRESSURE: 68 MMHG | SYSTOLIC BLOOD PRESSURE: 116 MMHG

## 2022-03-02 DIAGNOSIS — L76.33 POSTOPERATIVE SEROMA OF SKIN AFTER DERMATOLOGIC PROCEDURE: ICD-10-CM

## 2022-03-02 DIAGNOSIS — Z48.02 ENCOUNTER FOR REMOVAL OF SUTURES: Primary | ICD-10-CM

## 2022-03-02 DIAGNOSIS — L72.0 EPIDERMAL CYST OF FACE: ICD-10-CM

## 2022-03-02 PROCEDURE — 87077 CULTURE AEROBIC IDENTIFY: CPT | Performed by: PHYSICIAN ASSISTANT

## 2022-03-02 PROCEDURE — 87070 CULTURE OTHR SPECIMN AEROBIC: CPT | Performed by: PHYSICIAN ASSISTANT

## 2022-03-02 PROCEDURE — 99024 POSTOP FOLLOW-UP VISIT: CPT | Performed by: PHYSICIAN ASSISTANT

## 2022-03-02 PROCEDURE — 87102 FUNGUS ISOLATION CULTURE: CPT | Performed by: PHYSICIAN ASSISTANT

## 2022-03-02 RX ORDER — CEPHALEXIN 500 MG/1
500 CAPSULE ORAL 2 TIMES DAILY
Qty: 10 CAPSULE | Refills: 0 | Status: SHIPPED | OUTPATIENT
Start: 2022-03-02 | End: 2022-03-08

## 2022-03-02 ASSESSMENT — PAIN SCALES - GENERAL: PAINLEVEL: NO PAIN (0)

## 2022-03-02 NOTE — PATIENT INSTRUCTIONS
Continue w/ wound cares.   Follow up in 1 week  Continue with Keflex (antibiotic) for 5 additional days.   If any concerns return sooner to ENT.       Thank you for allowing Geena Kline PA-C and our ENT team to participate in your care.  If your medications are too expensive, please give the nurse a call.  We can possibly change this medication.  If you have a scheduling or an appointment question please contact our Health Unit Coordinator at [phone], Ext. [phone].    ALL nursing questions or concerns can be directed to your ENT nurse at: [phone] [facility]      Postoperative Instructions for Wound Care:    After Surgery:   1. Clean the wound with a gentle skin cleanser (ex: Cetaphil) three times a day    2. Apply antibiotic ointment (Bacitracin) to the wound three times a day for 2 weeks    3. Cover the wound with a sterile dressing (if advised)      6 Weeks from the date of surgery:    1. Apply any type of lotion to the suture site    2. Massage the surgical area 1-2 times daily in a circular motion for 5 minutes, for a period of 2 months. This will help the scar to heal better

## 2022-03-02 NOTE — NURSING NOTE
"Chief Complaint   Patient presents with     RECHECK     Follow up right infraorbital wound exploration in office 2/21/22.       Initial /68 (Cuff Size: Adult Regular)   Pulse 80   Temp 98.1  F (36.7  C) (Tympanic)   Ht 1.854 m (6' 1\")   Wt 78.5 kg (173 lb)   SpO2 98%   BMI 22.82 kg/m   Estimated body mass index is 22.82 kg/m  as calculated from the following:    Height as of this encounter: 1.854 m (6' 1\").    Weight as of this encounter: 78.5 kg (173 lb).  Medication Reconciliation: complete  Leona Maxwell LPN    "

## 2022-03-02 NOTE — LETTER
"    3/2/2022         RE: Sarath Herrera  [address] [facility] [address]        Dear Colleague,    Thank you for referring your patient, Sarath Herrera, to the [facility]. Please see a copy of my visit note below.    Chief Complaint   Patient presents with     RECHECK     Follow up right infraorbital wound exploration in office 2/21/22.       Sarath Herrera is a 36 year old male  13 days status post excision of a large right infraorbital epidermal inclusion cyst presents for recheck. He did see Dr. Wilson on 2/21/22 for concerns of seroma. Wound was reexamined and re approximated and keflex extended for 2 weeks. He was recommended to return in 1 week for recheck and suture removal.   Today, Sarath has been doing well.   He reports no drainage since his last visit. He has noticed less swelling. Overall improved over the last few days.   He has no vision concerns or eye pain.   He was placed on Keflex postoperatively and given oxycodone for pain not controlled with Tylenol and ibuprofen  Although symptoms appear to be a simple epidermal cyst due to the size and location I did send this to pathology and the final benign, noted be a keratinous cyst, epidermal.        No past medical history on file.   No Known Allergies  Current Outpatient Medications   Medication     cephALEXin (KEFLEX) 500 MG capsule     oxyCODONE (ROXICODONE) 5 MG tablet     oxyCODONE (ROXICODONE) 5 MG tablet     No current facility-administered medications for this visit.     ROS- SEE HPI  /68 (Cuff Size: Adult Regular)   Pulse 80   Temp 98.1  F (36.7  C) (Tympanic)   Ht 1.854 m (6' 1\")   Wt 78.5 kg (173 lb)   SpO2 98%   BMI 22.82 kg/m      General - The patient is well nourished and well developed, and appears to have good nutritional status.  Alert and oriented to person and place, interactive.  Head and Face - Normocephalic and atraumatic, with no gross asymmetry noted of the contour of the facial features.  The " facial nerve is intact, with strong symmetric movements.   ears- Clear EAC. TM intact without effusion or retraction.     Eyes - Extraocular movements intact.   He has diffuse right infraorbital ecchymosis and mild surrounding lid edema with slight fluctuance, no purulence  Right upper lid mild echymosis  steris removed  The incision site appears with nylon sutures in place. Scant crusting. Mild edema. No erythema or warmth. Ecchymosis appears improved.   Upon suture removal, there is one small area of oozing, thick secretions approx 2 ml of drainage. Culture obtained. No further drainage noted.   Photos taken  Site was cleaned and dressing applied.       ASSESSMENT/ PLAN:        ICD-10-CM    1. Encounter for removal of sutures  Z48.02    2. Postoperative seroma of skin after dermatologic procedure  L76.33 Fungus Culture, non-blood     cephALEXin (KEFLEX) 500 MG capsule     Swab Aerobic Bacterial Culture Routine     CANCELED: Anaerobic and Aerobic Culture with Gram Stain ([facility])     CANCELED: Anaerobic and Aerobic Culture with Gram Stain ([facility])   3. Epidermal cyst of face  L72.0 Fungus Culture, non-blood     cephALEXin (KEFLEX) 500 MG capsule     Swab Aerobic Bacterial Culture Routine     CANCELED: Anaerobic and Aerobic Culture with Gram Stain ([facility])     CANCELED: Anaerobic and Aerobic Culture with Gram Stain ([facility])     Continue w/ wound cares.   Follow up in 1 week  Continue with Keflex (antibiotic) for 5 additional days.   If any concerns return sooner to ENT.       Pathology reviewed, Benign.           Geena Kline PA-C  ENT  [facility], [place]             Again, thank you for allowing me to participate in the care of your patient.        Sincerely,        Geena Kline PA-C

## 2022-03-04 NOTE — PROGRESS NOTES
"Chief Complaint   Patient presents with     RECHECK     follow up postoperative seroma.     Sarath Herrera is a 36 year old male  20 days status post excision of a large right infraorbital epidermal inclusion cyst presents for recheck. He did see Dr. Wilson on 2/21/22 for concerns of seroma. Wound was reexamined and re approximated and keflex extended for 2 weeks. He had sutures removed last week but persisted w/ continued drainage.    At his last visit, I removed sutures which noted drainage, seroma weeping from site. Culture was obtained. He was started on KeflexToday, Sarath has been doing well. He reports very minimal drainage and   He reports no drainage since his last visit. He has noticed less swelling. Overall improved over the last few days.    He has no vision concerns or eye pain.     Although symptoms appear to be a simple epidermal cyst due to the size and location I did send this to pathology and the final benign, noted be a keratinous cyst, epidermal.        No past medical history on file.   No Known Allergies  Current Outpatient Medications   Medication     cephALEXin (KEFLEX) 500 MG capsule     No current facility-administered medications for this visit.     ROS - SEE HPI  /68 (BP Location: Right arm, Cuff Size: Adult Regular)   Pulse 73   Temp 98.8  F (37.1  C) (Tympanic)   Ht 1.854 m (6' 1\")   Wt 79.4 kg (175 lb)   SpO2 99%   BMI 23.09 kg/m        General - The patient is well nourished and well developed, and appears to have good nutritional status.  Alert and oriented to person and place, interactive.  Head and Face - Normocephalic and atraumatic, with no gross asymmetry noted of the contour of the facial features.  The facial nerve is intact, with strong symmetric movements.  Eyes - Extraocular movements intact.   He has diffuse right infraorbital ecchymosis and mild surrounding lid edema with slight fluctuance, no purulence  Right upper lid mild edema.     The incision site " appears healing well. There is one small site which appears healing by secondaty intention. There is no active purulence.   Ecchymosis appears improved.   Photos taken  Site was cleaned        ASSESSMENT/ PLAN:    ICD-10-CM    1. Postoperative seroma of skin after dermatologic procedure  L76.33    2. Epidermal cyst of face  L72.0          Discussed with Sarath his site appears healing well.   Continue with wound cares  He declined any further management in terms to possible surgical revisions.   He is happy at this time.   Discussed cares. Consider ophthalmology.         Geena Kline PA-C  ENT  [facility], [place]

## 2022-03-05 LAB — BACTERIA SPEC CULT: ABNORMAL

## 2022-03-07 LAB — BACTERIA SPEC CULT: NO GROWTH

## 2022-03-08 ENCOUNTER — OFFICE VISIT (OUTPATIENT)
Dept: OTOLARYNGOLOGY | Facility: OTHER | Age: 37
End: 2022-03-08
Attending: PHYSICIAN ASSISTANT

## 2022-03-08 VITALS
DIASTOLIC BLOOD PRESSURE: 68 MMHG | HEART RATE: 73 BPM | BODY MASS INDEX: 23.19 KG/M2 | WEIGHT: 175 LBS | TEMPERATURE: 98.8 F | OXYGEN SATURATION: 99 % | HEIGHT: 73 IN | SYSTOLIC BLOOD PRESSURE: 116 MMHG

## 2022-03-08 DIAGNOSIS — L72.0 EPIDERMAL CYST OF FACE: ICD-10-CM

## 2022-03-08 DIAGNOSIS — L76.33 POSTOPERATIVE SEROMA OF SKIN AFTER DERMATOLOGIC PROCEDURE: Primary | ICD-10-CM

## 2022-03-08 PROCEDURE — 99212 OFFICE O/P EST SF 10 MIN: CPT | Performed by: PHYSICIAN ASSISTANT

## 2022-03-08 ASSESSMENT — PAIN SCALES - GENERAL: PAINLEVEL: NO PAIN (0)

## 2022-03-08 NOTE — LETTER
"    3/8/2022         RE: Sarath Herrera  [address] [facility] [address]        Dear Colleague,    Thank you for referring your patient, Sarath Herrera, to the [facility]. Please see a copy of my visit note below.    Chief Complaint   Patient presents with     RECHECK     follow up postoperative seroma.     Sarath Herrera is a 36 year old male  20 days status post excision of a large right infraorbital epidermal inclusion cyst presents for recheck. He did see Dr. Wilson on 2/21/22 for concerns of seroma. Wound was reexamined and re approximated and keflex extended for 2 weeks. He had sutures removed last week but persisted w/ continued drainage.    At his last visit, I removed sutures which noted drainage, seroma weeping from site. Culture was obtained. He was started on KeflexToday, Sarath has been doing well. He reports very minimal drainage and   He reports no drainage since his last visit. He has noticed less swelling. Overall improved over the last few days.    He has no vision concerns or eye pain.     Although symptoms appear to be a simple epidermal cyst due to the size and location I did send this to pathology and the final benign, noted be a keratinous cyst, epidermal.        No past medical history on file.   No Known Allergies  Current Outpatient Medications   Medication     cephALEXin (KEFLEX) 500 MG capsule     No current facility-administered medications for this visit.     ROS - SEE HPI  /68 (BP Location: Right arm, Cuff Size: Adult Regular)   Pulse 73   Temp 98.8  F (37.1  C) (Tympanic)   Ht 1.854 m (6' 1\")   Wt 79.4 kg (175 lb)   SpO2 99%   BMI 23.09 kg/m        General - The patient is well nourished and well developed, and appears to have good nutritional status.  Alert and oriented to person and place, interactive.  Head and Face - Normocephalic and atraumatic, with no gross asymmetry noted of the contour of the facial features.  The facial nerve is intact, with " strong symmetric movements.  Eyes - Extraocular movements intact.   He has diffuse right infraorbital ecchymosis and mild surrounding lid edema with slight fluctuance, no purulence  Right upper lid mild edema.     The incision site appears healing well. There is one small site which appears healing by secondaty intention. There is no active purulence.   Ecchymosis appears improved.   Photos taken  Site was cleaned        ASSESSMENT/ PLAN:    ICD-10-CM    1. Postoperative seroma of skin after dermatologic procedure  L76.33    2. Epidermal cyst of face  L72.0          Discussed with Sarath his site appears healing well.   Continue with wound cares  He declined any further management in terms to possible surgical revisions.   He is happy at this time.   Discussed cares. Consider ophthalmology.         Geena Kline PA-C  ENT  [facility], [place]             Again, thank you for allowing me to participate in the care of your patient.        Sincerely,        Geena Kline PA-C

## 2022-03-08 NOTE — NURSING NOTE
"Chief Complaint   Patient presents with     RECHECK     follow up postoperative seroma.       Initial /68 (BP Location: Right arm, Cuff Size: Adult Regular)   Pulse 73   Temp 98.8  F (37.1  C) (Tympanic)   Ht 1.854 m (6' 1\")   Wt 79.4 kg (175 lb)   SpO2 99%   BMI 23.09 kg/m   Estimated body mass index is 23.09 kg/m  as calculated from the following:    Height as of this encounter: 1.854 m (6' 1\").    Weight as of this encounter: 79.4 kg (175 lb).  Medication Reconciliation: complete  Jing Barreto LPN    "

## 2022-03-08 NOTE — PATIENT INSTRUCTIONS
Continue with wound cares.   Follow up as needed.     Thank you for allowing Geena Kline PA-C and our ENT team to participate in your care.  If your medications are too expensive, please give the nurse a call.  We can possibly change this medication.  If you have a scheduling or an appointment question please contact our Health Unit Coordinator at [phone], Ext. [phone].    ALL nursing questions or concerns can be directed to your ENT nurse at: [phone] Jing